# Patient Record
Sex: FEMALE | Race: WHITE | Employment: FULL TIME | URBAN - METROPOLITAN AREA
[De-identification: names, ages, dates, MRNs, and addresses within clinical notes are randomized per-mention and may not be internally consistent; named-entity substitution may affect disease eponyms.]

---

## 2021-07-15 ENCOUNTER — HOSPITAL ENCOUNTER (OUTPATIENT)
Dept: WOMENS IMAGING | Age: 41
Discharge: HOME | End: 2021-07-15
Payer: OTHER GOVERNMENT

## 2021-07-15 DIAGNOSIS — Z12.31 ENCOUNTER FOR SCREENING MAMMOGRAM FOR BREAST CANCER: ICD-10-CM

## 2021-07-15 PROCEDURE — 77067 SCR MAMMO BI INCL CAD: CPT

## 2021-08-12 ENCOUNTER — APPOINTMENT (OUTPATIENT)
Dept: GENERAL RADIOLOGY | Age: 41
End: 2021-08-12
Payer: COMMERCIAL

## 2021-08-12 ENCOUNTER — HOSPITAL ENCOUNTER (EMERGENCY)
Age: 41
Discharge: HOME | End: 2021-08-12
Payer: COMMERCIAL

## 2021-08-12 VITALS
DIASTOLIC BLOOD PRESSURE: 78 MMHG | HEIGHT: 64 IN | TEMPERATURE: 97.2 F | WEIGHT: 188 LBS | HEART RATE: 84 BPM | SYSTOLIC BLOOD PRESSURE: 126 MMHG | BODY MASS INDEX: 32.1 KG/M2 | RESPIRATION RATE: 16 BRPM | OXYGEN SATURATION: 99 %

## 2021-08-12 DIAGNOSIS — S93.401A MODERATE RIGHT ANKLE SPRAIN, INITIAL ENCOUNTER: Primary | ICD-10-CM

## 2021-08-12 PROCEDURE — 99283 EMERGENCY DEPT VISIT LOW MDM: CPT

## 2021-08-12 PROCEDURE — 73610 X-RAY EXAM OF ANKLE: CPT

## 2021-08-12 ASSESSMENT — PAIN DESCRIPTION - DESCRIPTORS: DESCRIPTORS: THROBBING

## 2021-08-12 ASSESSMENT — PAIN SCALES - GENERAL: PAINLEVEL_OUTOF10: 5

## 2021-08-12 ASSESSMENT — PAIN DESCRIPTION - PAIN TYPE: TYPE: ACUTE PAIN

## 2021-08-12 ASSESSMENT — PAIN DESCRIPTION - FREQUENCY: FREQUENCY: CONTINUOUS

## 2021-08-12 ASSESSMENT — PAIN DESCRIPTION - ORIENTATION: ORIENTATION: RIGHT

## 2021-08-12 ASSESSMENT — PAIN DESCRIPTION - LOCATION: LOCATION: ANKLE

## 2021-08-12 NOTE — ED PROVIDER NOTES
ALLERGIES     Penicillins    FAMILYHISTORY     No family history on file. SOCIAL HISTORY       Social History     Socioeconomic History    Marital status:      Spouse name: Not on file    Number of children: Not on file    Years of education: Not on file    Highest education level: Not on file   Occupational History    Not on file   Tobacco Use    Smoking status: Former Smoker     Packs/day: 1.00     Years: 24.00     Pack years: 24.00     Start date: 1996     Quit date: 2020     Years since quittin.4    Smokeless tobacco: Never Used   Substance and Sexual Activity    Alcohol use: Not Currently    Drug use: Not Currently    Sexual activity: Not on file   Other Topics Concern    Not on file   Social History Narrative    Not on file     Social Determinants of Health     Financial Resource Strain:     Difficulty of Paying Living Expenses:    Food Insecurity:     Worried About Running Out of Food in the Last Year:     [address] in the Last Year:    Transportation Needs:     Lack of Transportation (Medical):      Lack of Transportation (Non-Medical):    Physical Activity:     Days of Exercise per Week:     Minutes of Exercise per Session:    Stress:     Feeling of Stress :    Social Connections:     Frequency of Communication with Friends and Family:     Frequency of Social Gatherings with Friends and Family:     Attends Druze Services:     Active Member of Clubs or Organizations:     Attends Club or Organization Meetings:     Marital Status:    Intimate Partner Violence:     Fear of Current or Ex-Partner:     Emotionally Abused:     Physically Abused:     Sexually Abused:        SCREENINGS             PHYSICAL EXAM    (up to 7 for level 4, 8 or more for level 5)     ED Triage Vitals [21 1735]   BP Temp Temp Source Pulse Resp SpO2 Height Weight   126/78 97.2 °F (36.2 °C) Tympanic 84 16 99 % 5' 4\" (1.626 m) 188 lb (85.3 kg)       Physical Exam  Vitals and nursing note reviewed. Constitutional:       Appearance: She is well-developed. She is not ill-appearing or toxic-appearing. HENT:      Head: Normocephalic and atraumatic. Cardiovascular:      Pulses: Normal pulses. Dorsalis pedis pulses are 2+ on the right side. Posterior tibial pulses are 2+ on the right side. Pulmonary:      Effort: Pulmonary effort is normal. No respiratory distress. Musculoskeletal:      Right ankle: Swelling and ecchymosis present. No deformity or lacerations. Tenderness present. Normal pulse. Comments: Tenderness, bruising and swelling to the right lateral and anterior ankle. No deformity. No open wounds. Intact sensation. Distal pulses 2+. Cap refill less than 2 seconds. Wiggling toes flexing and extending foot but extension causes pain. No tenderness at the knee. Skin:     General: Skin is warm and dry. Capillary Refill: Capillary refill takes less than 2 seconds. Neurological:      Mental Status: She is alert and oriented to person, place, and time. Sensory: Sensation is intact. Motor: Motor function is intact. No abnormal muscle tone. Psychiatric:         Behavior: Behavior normal.         DIAGNOSTIC RESULTS   LABS:    Labs Reviewed - No data to display    All other labs were within normal range or not returned as of this dictation. EKG: All EKG's are interpreted by the Emergency Department Physician in the absence of a cardiologist.  Please see their note for interpretation of EKG. RADIOLOGY:   Non-plain film images such as CT, Ultrasound and MRI are read by the radiologist. Plain radiographic images are visualized andpreliminarily interpreted by the  ED Provider with the below findings:        Interpretation perthe Radiologist below, if available at the time of this note:    XR ANKLE RIGHT (MIN 3 VIEWS)   Final Result   No acute osseous injury. Lateral soft tissue swelling.              XR ANKLE RIGHT (MIN 3 VIEWS)    Result Date: 8/12/2021  EXAMINATION: THREE XRAY VIEWS OF THE RIGHT ANKLE 8/12/2021 6:40 pm COMPARISON: 02/20/2017 HISTORY: ORDERING SYSTEM PROVIDED HISTORY: fall r/o fx TECHNOLOGIST PROVIDED HISTORY: Reason for exam:->fall r/o fx Reason for Exam: twisted ankle last week FINDINGS: The ankle mortise is symmetrical.  There is no acute fracture or dislocation. Lateral soft tissue swelling is noted. No acute osseous injury. Lateral soft tissue swelling. PROCEDURES   Unless otherwise noted below, none     Procedures    CRITICAL CARE TIME   N/A    CONSULTS:  None      EMERGENCY DEPARTMENT COURSE and DIFFERENTIAL DIAGNOSIS/MDM:   Vitals:    Vitals:    08/12/21 1735   BP: 126/78   Pulse: 84   Resp: 16   Temp: 97.2 °F (36.2 °C)   TempSrc: Tympanic   SpO2: 99%   Weight: 188 lb (85.3 kg)   Height: 5' 4\" (1.626 m)       Patient was given thefollowing medications:  Medications - No data to display      ED course  Patient presented to the ER for evaluation of right ankle pain and injury occurred 6 days ago still having pain swelling and bruising came in for evaluation. X-rays obtained. No fracture seen. No dislocation. Impression moderate right ankle sprain. She will be placed in Aircast and on crutches. Advised ice rest elevate, Tylenol and ibuprofen for pain. She was about a referral to orthopedics if not improving. return for worsening. I estimate there is LOW risk for  COMPARTMENT SYNDROME, DEEP VENOUS THROMBOSIS, SEPTIC ARTHRITIS, TENDON OR NEUROVASCULAR INJURY, thus I consider the discharge disposition reasonable. FINAL IMPRESSION      1.  Moderate right ankle sprain, initial encounter          DISPOSITION/PLAN   DISPOSITION Discharge - Pending Orders Complete    PATIENT REFERREDTO:  Kari Quijano, APRN - CNP  [address] [facility] [address] [facility]  [phone]    In 1 week      Santos Moseley MD  [facility] [address]  [phone]      As needed      DISCHARGE MEDICATIONS:  New Prescriptions    No medications on file       DISCONTINUED MEDICATIONS:  Discontinued Medications    No medications on file              (Please note that portions ofthis note were completed with a voice recognition program.  Efforts were made to edit the dictations but occasionally words are mis-transcribed.)    No Etienne PA-C (electronically signed)            Anish Garrido PA-C  08/12/21 0697

## 2021-08-12 NOTE — ED NOTES
Bed: T2  Expected date:   Expected time:   Means of arrival:   Comments:  GISSELL Rogel  08/12/21 1211

## 2021-08-12 NOTE — ED NOTES
Discharge instructions reviewed with Ms. Cuate Hills. She verbalized understanding. Copy of discharge instructions given. Ms. Cuate Hills was discharged to home in good condition per personal vehicle, family driving. She exited the ED without difficulty.         Tonie Rosales RN  08/12/21 3512

## 2021-11-06 ENCOUNTER — HOSPITAL ENCOUNTER (EMERGENCY)
Age: 41
Discharge: HOME | End: 2021-11-06
Payer: COMMERCIAL

## 2021-11-06 VITALS
BODY MASS INDEX: 32.44 KG/M2 | HEART RATE: 90 BPM | DIASTOLIC BLOOD PRESSURE: 82 MMHG | WEIGHT: 190 LBS | SYSTOLIC BLOOD PRESSURE: 124 MMHG | RESPIRATION RATE: 16 BRPM | TEMPERATURE: 98.9 F | HEIGHT: 64 IN | OXYGEN SATURATION: 99 %

## 2021-11-06 DIAGNOSIS — G89.29 ACUTE EXACERBATION OF CHRONIC LOW BACK PAIN: Primary | ICD-10-CM

## 2021-11-06 DIAGNOSIS — M54.32 SCIATICA OF LEFT SIDE: ICD-10-CM

## 2021-11-06 DIAGNOSIS — M54.50 ACUTE EXACERBATION OF CHRONIC LOW BACK PAIN: Primary | ICD-10-CM

## 2021-11-06 PROCEDURE — 99284 EMERGENCY DEPT VISIT MOD MDM: CPT

## 2021-11-06 PROCEDURE — 6370000000 HC RX 637 (ALT 250 FOR IP): Performed by: NURSE PRACTITIONER

## 2021-11-06 RX ORDER — LIDOCAINE 4 G/G
1 PATCH TOPICAL ONCE
Status: DISCONTINUED | OUTPATIENT
Start: 2021-11-06 | End: 2021-11-07 | Stop reason: HOSPADM

## 2021-11-06 RX ORDER — METHYLPHENIDATE HYDROCHLORIDE 27 MG/1
36 TABLET, EXTENDED RELEASE ORAL DAILY
COMMUNITY

## 2021-11-06 RX ORDER — METHOCARBAMOL 500 MG/1
500 TABLET, FILM COATED ORAL ONCE
Status: COMPLETED | OUTPATIENT
Start: 2021-11-06 | End: 2021-11-06

## 2021-11-06 RX ORDER — METHOCARBAMOL 500 MG/1
500 TABLET, FILM COATED ORAL 3 TIMES DAILY
Qty: 15 TABLET | Refills: 0 | Status: SHIPPED | OUTPATIENT
Start: 2021-11-06 | End: 2021-11-11

## 2021-11-06 RX ORDER — LIDOCAINE 50 MG/G
PATCH TOPICAL
COMMUNITY
Start: 2021-06-17

## 2021-11-06 RX ORDER — ATORVASTATIN CALCIUM 40 MG/1
TABLET, FILM COATED ORAL
COMMUNITY
Start: 2021-06-21

## 2021-11-06 RX ORDER — PREDNISONE 20 MG/1
60 TABLET ORAL ONCE
Status: COMPLETED | OUTPATIENT
Start: 2021-11-06 | End: 2021-11-06

## 2021-11-06 RX ORDER — PREDNISONE 20 MG/1
TABLET ORAL
Qty: 18 TABLET | Refills: 0 | Status: SHIPPED | OUTPATIENT
Start: 2021-11-06

## 2021-11-06 RX ADMIN — PREDNISONE 60 MG: 20 TABLET ORAL at 21:55

## 2021-11-06 RX ADMIN — METHOCARBAMOL TABLETS 500 MG: 500 TABLET, COATED ORAL at 21:55

## 2021-11-06 ASSESSMENT — ENCOUNTER SYMPTOMS
SORE THROAT: 0
RHINORRHEA: 0
COLOR CHANGE: 0
BACK PAIN: 1
ABDOMINAL PAIN: 0
SHORTNESS OF BREATH: 0

## 2021-11-06 ASSESSMENT — PAIN DESCRIPTION - LOCATION: LOCATION: BACK

## 2021-11-06 ASSESSMENT — PAIN DESCRIPTION - PAIN TYPE: TYPE: ACUTE PAIN;CHRONIC PAIN

## 2021-11-06 ASSESSMENT — PAIN DESCRIPTION - DESCRIPTORS: DESCRIPTORS: ACHING;RADIATING;SHOOTING

## 2021-11-06 ASSESSMENT — PAIN DESCRIPTION - FREQUENCY: FREQUENCY: CONTINUOUS

## 2021-11-06 ASSESSMENT — PAIN DESCRIPTION - ORIENTATION: ORIENTATION: LEFT

## 2021-11-06 ASSESSMENT — PAIN SCALES - GENERAL: PAINLEVEL_OUTOF10: 4

## 2021-11-07 NOTE — ED PROVIDER NOTES
Evaluated by [id number] [facility] Provider          [facility] ED  EMERGENCY DEPARTMENT ENCOUNTER        Pt Name: Clint Flores  MRN: [id number]  [place] 1980  Dateof evaluation: 11/6/2021  Provider: ALFREDITO Turner CNP  PCP: ALFREDITO Malone CNP  ED Attending: No att. providers found    [address] [facility]       Chief Complaint   Patient presents with    Back Pain     lower back pain down oeft leg; HX back pain        HISTORY OF PRESENTILLNESS   (Location/Symptom, Timing/Onset, Context/Setting, Quality, Duration, Modifying Factors, Severity)  Note limiting factors. Clint Flores is a 39 y.o. female for lower back pain that radiates to her left buttocks. Onset was the last day. Context includes patient reports over the last day she has had increasing lower back pain that makes her left butt cheek. Patient reports that she has a history of chronic back pain. Patient denies any recent injury. Alleviating factors include nothing. Aggravating factors include nothing. Pain is 3/10. Ibuprofen has been used for pain today. Nursing Notes were all reviewed and agreed with or any disagreements were addressed  in the HPI. REVIEW OF SYSTEMS    (2-9 systems for level 4, 10 or more for level 5)     Review of Systems   Constitutional: Negative for fever. HENT: Negative for congestion, rhinorrhea and sore throat. Respiratory: Negative for shortness of breath. Cardiovascular: Negative for chest pain. Gastrointestinal: Negative for abdominal pain. Genitourinary: Negative for decreased urine volume and difficulty urinating. Musculoskeletal: Positive for back pain. Negative for arthralgias and myalgias. Skin: Negative for color change and rash. Neurological: Negative for dizziness and light-headedness. Psychiatric/Behavioral: Negative for agitation. All other systems reviewed and are negative. Positives and Pertinent negatives as per HPI.   Except as noted above in the ROS, all other systems were reviewed and negative. PAST MEDICAL HISTORY   History reviewed. No pertinent past medical history. SURGICAL HISTORY     History reviewed. No pertinent surgical history. CURRENT MEDICATIONS       Previous Medications    ATORVASTATIN (LIPITOR) 40 MG TABLET    TAKE ONE-HALF TABLET BY MOUTH AT BEDTIME FOR CHOLESTEROL    IBUPROFEN (ADVIL;MOTRIN) 800 MG TABLET    Take 1 tablet by mouth every 6 hours as needed for Pain    LIDOCAINE (LIDODERM) 5 %    APPLY 1 PATCH TO SKIN ONCE DAILY MAY LEAVE ON UP TO 12 HOURS PER DAY (PRESS FIRMLY FOR 10-15 SECONDS FOR BEST ADHERENCE)    METHYLPHENIDATE 27 MG CR TABLET    Take by mouth daily. ALLERGIES     Penicillins    FAMILY HISTORY     History reviewed. No pertinent family history. SOCIAL HISTORY       Social History     Socioeconomic History    Marital status:      Spouse name: None    Number of children: None    Years of education: None    Highest education level: None   Occupational History    None   Tobacco Use    Smoking status: Former Smoker     Packs/day: 1.00     Years: 24.00     Pack years: 24.00     Start date: 1996     Quit date: 2020     Years since quittin.7    Smokeless tobacco: Never Used   Substance and Sexual Activity    Alcohol use: Not Currently    Drug use: Not Currently    Sexual activity: None   Other Topics Concern    None   Social History Narrative    None     Social Determinants of Health     Financial Resource Strain:     Difficulty of Paying Living Expenses: Not on file   Food Insecurity:     Worried About Running Out of Food in the Last Year: Not on file    [place] of Food in the Last Year: Not on file   Transportation Needs:     Lack of Transportation (Medical): Not on file    Lack of Transportation (Non-Medical):  Not on file   Physical Activity:     Days of Exercise per Week: Not on file    Minutes of Exercise per Session: Not on file Stress:     Feeling of Stress : Not on file   Social Connections:     Frequency of Communication with Friends and Family: Not on file    Frequency of Social Gatherings with Friends and Family: Not on file    Attends Restoration Services: Not on file    Active Member of [address] [place] [address] [employer] or Organizations: Not on file    Attends Club or Organization Meetings: Not on file    Marital Status: Not on file   Intimate Partner Violence:     Fear of Current or Ex-Partner: Not on file    Emotionally Abused: Not on file    Physically Abused: Not on file    Sexually Abused: Not on file   Housing Stability:     Unable to Pay for Housing in the Last Year: Not on file    Number of [place] in the Last Year: Not on file    Unstable Housing in the Last Year: Not on file       SCREENINGS             PHYSICAL EXAM  (up to 7 for level 4, 8 or more for level 5)     ED Triage Vitals [11/06/21 2133]   BP Temp Temp Source Pulse Resp SpO2 Height Weight   125/82 99.1 °F (37.3 °C) Oral 90 20 98 % 5' 4\" (1.626 m) 190 lb (86.2 kg)       Physical Exam  Constitutional:       Appearance: She is well-developed. HENT:      Head: Normocephalic and atraumatic. Cardiovascular:      Rate and Rhythm: Normal rate. Pulmonary:      Effort: Pulmonary effort is normal. No respiratory distress. Abdominal:      General: There is no distension. Palpations: Abdomen is soft. Musculoskeletal:         General: Tenderness present. No swelling, deformity or signs of injury. Normal range of motion. Cervical back: Normal range of motion. Comments: Decreased range of motion due to pain elicited with movement back. Tenderness with palpation to the paraspinal lumbar region. Patient left-sided radiculopathy. Patient denies any caudal anesthesia. Sensation strength and pulses intact to lower extremities. Deep tendon reflexes intact. Skin:     General: Skin is warm and dry.    Neurological:      Mental Status: She is alert and oriented to person, place, and time. DIAGNOSTIC RESULTS   LABS:    Labs Reviewed - No data to display    All other labs werewithin normal range or not returned as of this dictation. EKG: All EKG's are interpreted by the Emergency Department Physician who either signs or Co-signs this chart in the absence of a cardiologist.  Please see their note for interpretation of EKG. RADIOLOGY:           Interpretation per the Radiologist below, if available at the time of this note:    No orders to display     No results found. PROCEDURES   Unless otherwise noted below, none     Procedures     CRITICAL CARE TIME   N/A    CONSULTS:  None      EMERGENCYDEPARTMENT COURSE and DIFFERENTIAL DIAGNOSIS/MDM:   Vitals:    Vitals:    11/06/21 2133   BP: 125/82   Pulse: 90   Resp: 20   Temp: 99.1 °F (37.3 °C)   TempSrc: Oral   SpO2: 98%   Weight: 190 lb (86.2 kg)   Height: 5' 4\" (1.626 m)       Patient was given the following medications:  Medications   lidocaine 4 % external patch 1 patch (1 patch TransDERmal Patch Applied 11/6/21 2155)   predniSONE (DELTASONE) tablet 60 mg (60 mg Oral Given 11/6/21 2155)   methocarbamol (ROBAXIN) tablet 500 mg (500 mg Oral Given 11/6/21 2155)       Patient was seen and evaluated by myself. Patient here for concerns for low back pain. Patient reports he has a history of low back pain however in the last few days she has had increasing pain that radiates to her left butt cheek. She denies any fever. On exam she is awake and alert hemodynamically stable nontoxic in appearance. She is neurologically intact her lower extremities. Patient was given lidocaine patch prednisone and Robaxin in the ED. She was discharged home with prescriptions for Robaxin and prednisone. She reports that she has lidocaine patches at home. She was given referrals for the orthopedist and for physical therapy.   She was encouraged to follow-up with her primary care doctor in the next few days and return to the ED for worsening symptoms. Patient was ultimately discharged with all questions answered. The patient tolerated their visit well. I have evaluated this patient. My supervising physician was available for consultation. The patient and / or the family were informed of the results of any tests, a time was given to answer questions, a plan was proposed and they agreed with plan. FINAL IMPRESSION      1. Acute exacerbation of chronic low back pain    2. Sciatica of left side          DISPOSITION/PLAN   DISPOSITION Discharge - Pending Orders Complete 11/06/2021 10:00:12 PM      PATIENT REFERRED TO:  France Bernheim, APRN - CNP  [address] [facility] [address] [facility]  [phone]    Schedule an appointment as soon as possible for a visit in 2 days  for re-evaluation    [facility] [place] [facility]  [address] [facility]  [phone]    If symptoms worsen    [address] [facility], [address] [place] [address]    [phone]  Call   to be re evaluated. , for re-evaluation    [facility] Physical Therapy  [address] [place] [address] [address]  Schedule an appointment as soon as possible for a visit in 1 week  for re-evaluation      DISCHARGE MEDICATIONS:  New Prescriptions    METHOCARBAMOL (ROBAXIN) 500 MG TABLET    Take 1 tablet by mouth 3 times daily for 5 days    PREDNISONE (DELTASONE) 20 MG TABLET    Take 3 tabs orally daily for 3 days, then take 2 tabs orally for 3 days then take 1 tab orally for 3 days.        DISCONTINUED MEDICATIONS:  Discontinued Medications    No medications on file              (Please note that portions of this note were completed with a voice recognition program.  Efforts were made to edit the dictations but occasionally words are mis-transcribed.)    ALFREDITO Barnes CNP (electronically signed)         ALFREDITO Barnes CNP  11/06/21 0741

## 2022-09-26 ENCOUNTER — OFFICE VISIT (OUTPATIENT)
Dept: ORTHOPEDIC SURGERY | Age: 42
End: 2022-09-26
Payer: COMMERCIAL

## 2022-09-26 VITALS — BODY MASS INDEX: 32.44 KG/M2 | WEIGHT: 190 LBS | HEIGHT: 64 IN

## 2022-09-26 DIAGNOSIS — M51.36 LUMBAR DEGENERATIVE DISC DISEASE: Primary | ICD-10-CM

## 2022-09-26 PROCEDURE — 99203 OFFICE O/P NEW LOW 30 MIN: CPT | Performed by: ORTHOPAEDIC SURGERY

## 2022-09-26 RX ORDER — FLUOXETINE HYDROCHLORIDE 20 MG/1
CAPSULE ORAL
COMMUNITY
Start: 2022-06-22

## 2022-09-26 NOTE — PROGRESS NOTES
New Patient: LUMBAR SPINE    Referring Provider:  Titus Banerjee MD    CHIEF COMPLAINT:    Chief Complaint   Patient presents with    Back Pain     NP lower back  LLE  Injured from epidural post childbirth  Seen at [facility]  Recent xr        HISTORY OF PRESENT ILLNESS:    Ms. Mariana Bernal  is a pleasant 43 y.o. female currently furred by Dr. Tre Butcher for the evaluation of low back and left leg pain. She is status post MLD left L5-S1 in 2018 her symptoms are better for about 3 years following the surgery. Her symptoms began following an epidural with childbirth 15 years ago. They have increased since that time. She rates her left anterior and posterior thigh pain 5/10. She has numbness tingling and weakness of her left leg. She denies saddle anesthesia and bowel or bladder abnormality      Current/Past Treatment:   Physical Therapy: Yes  Chiropractic: Yes  Injection: None  Medications: Oral steroids and NSAIDs    Past Medical History:   No past medical history on file. Past Surgical History:     No past surgical history on file. Current Medications:     Current Outpatient Medications:     atorvastatin (LIPITOR) 40 MG tablet, TAKE ONE-HALF TABLET BY MOUTH AT BEDTIME FOR CHOLESTEROL, Disp: , Rfl:     methylphenidate 27 MG CR tablet, Take by mouth daily. , Disp: , Rfl:     lidocaine (LIDODERM) 5 %, APPLY 1 PATCH TO SKIN ONCE DAILY MAY LEAVE ON UP TO 12 HOURS PER DAY (PRESS FIRMLY FOR 10-15 SECONDS FOR BEST ADHERENCE), Disp: , Rfl:     predniSONE (DELTASONE) 20 MG tablet, Take 3 tabs orally daily for 3 days, then take 2 tabs orally for 3 days then take 1 tab orally for 3 days. , Disp: 18 tablet, Rfl: 0    ibuprofen (ADVIL;MOTRIN) 800 MG tablet, Take 1 tablet by mouth every 6 hours as needed for Pain, Disp: 30 tablet, Rfl: 0  Allergies:  Penicillins  Social History:    reports that she quit smoking about 2 years ago. She started smoking about 26 years ago. She has a 24.00 pack-year smoking history.  She has never used smokeless tobacco. She reports that she does not currently use alcohol. She reports that she does not currently use drugs. Family History:   No family history on file. REVIEW OF SYSTEMS: Full ROS noted & scanned   CONSTITUTIONAL: Denies unexplained weight loss, fevers, chills or fatigue  NEUROLOGICAL: Denies unsteady gait or progressive weakness  MUSCULOSKELETAL: Denies joint swelling or redness  PSYCHOLOGICAL: Denies anxiety, depression   SKIN: Denies skin changes, delayed healing, rash, itching   HEMATOLOGIC: Denies easy bleeding or bruising  ENDOCRINE: Denies excessive thirst, urination, heat/cold  RESPIRATORY: Denies current dyspnea, cough  GI: Denies nausea, vomiting, diarrhea   : Denies bowel or bladder issues      PHYSICAL EXAM:    Vitals: There were no vitals taken for this visit. GENERAL EXAM:  General Apparence: Patient is adequately groomed with no evidence of malnutrition. Orientation: The patient is oriented to time, place and person. Mood & Affect:The patient's mood and affect are appropriate. Vascular: Examination reveals no swelling tenderness in upper or lower extremities. Good capillary refill. Lymphatic: The lymphatic examination bilaterally reveals all areas to be without enlargement or induration  Sensation: Sensation is intact without deficit  Coordination/Balance: Good coordination     LUMBAR/SACRAL EXAMINATION:  Inspection: Local inspection shows no step-off or bruising. Lumbar alignment is normal.  Sagittal and Coronal balance is neutral.      Palpation:   No evidence of tenderness at the midline. No tenderness bilaterally at the paraspinal or trochanters. There is no step-off or paraspinal spasm. Range of Motion: Lumbar flexion, extension and rotation are mildly limited due to pain. Strength:   Strength testing is 5/5 in all muscle groups tested. Special Tests:   Straight leg raise and crossed SLR negative. Leg length and pelvis level.      Skin: There are no rashes, ulcerations or lesions. Reflexes: Reflexes are symmetrically 2+ at the patellar and ankle tendons. Clonus absent bilaterally at the feet. Gait & station: normal, patient ambulates without assistance    Additional Examinations:   RIGHT LOWER EXTREMITY: Inspection/examination of the right lower extremity does not show any tenderness, deformity or injury. Range of motion is unremarkable. There is no gross instability. There are no rashes, ulcerations or lesions. Strength and tone are normal.    LEFT LOWER EXTREMITY:  Inspection/examination of the left lower extremity does not show any tenderness, deformity or injury. Range of motion is unremarkable. There is no gross instability. There are no rashes, ulcerations or lesions. Strength and tone are normal.    Diagnostic Testing:    I reviewed my images of the lumbar spine from 7/17/22 in the office today. Those show postsurgical changes left L5-S1 without recurrent disc herniation    I was unable to review EMG results from July 17 2022 in the office today. She was told that her EMG was \"okay\"      Impression:   Lumbar degenerative disc disease    Plan:    Discussed treatment options including observation, physical therapy, epidural injection, gabapentin and additional imaging. I told her I did not believe spinal surgery is indicated at this time.   She will consider discussing gabapentin with Dr. Kennedy Byrne

## 2023-02-15 ENCOUNTER — HOSPITAL ENCOUNTER (OUTPATIENT)
Dept: ULTRASOUND IMAGING | Age: 43
Discharge: HOME | End: 2023-02-15
Payer: OTHER GOVERNMENT

## 2023-02-15 ENCOUNTER — HOSPITAL ENCOUNTER (OUTPATIENT)
Dept: MAMMOGRAPHY | Age: 43
Discharge: HOME | End: 2023-02-15
Payer: OTHER GOVERNMENT

## 2023-02-15 DIAGNOSIS — N64.4 BREAST PAIN, RIGHT: ICD-10-CM

## 2023-02-15 DIAGNOSIS — N64.4 MASTODYNIA: ICD-10-CM

## 2023-02-15 PROCEDURE — 76642 ULTRASOUND BREAST LIMITED: CPT

## 2023-02-15 PROCEDURE — G0279 TOMOSYNTHESIS, MAMMO: HCPCS
